# Patient Record
Sex: FEMALE | Race: WHITE | NOT HISPANIC OR LATINO | Employment: OTHER | URBAN - NONMETROPOLITAN AREA
[De-identification: names, ages, dates, MRNs, and addresses within clinical notes are randomized per-mention and may not be internally consistent; named-entity substitution may affect disease eponyms.]

---

## 2017-01-13 ENCOUNTER — OFFICE VISIT (OUTPATIENT)
Dept: MEDICAL GROUP | Facility: PHYSICIAN GROUP | Age: 72
End: 2017-01-13
Payer: MEDICARE

## 2017-01-13 VITALS
WEIGHT: 257 LBS | TEMPERATURE: 98.1 F | SYSTOLIC BLOOD PRESSURE: 138 MMHG | HEART RATE: 92 BPM | HEIGHT: 66 IN | RESPIRATION RATE: 16 BRPM | BODY MASS INDEX: 41.3 KG/M2 | DIASTOLIC BLOOD PRESSURE: 86 MMHG | OXYGEN SATURATION: 97 %

## 2017-01-13 DIAGNOSIS — E66.01 MORBID OBESITY WITH BMI OF 40.0-44.9, ADULT (HCC): ICD-10-CM

## 2017-01-13 DIAGNOSIS — E78.5 DYSLIPIDEMIA: ICD-10-CM

## 2017-01-13 DIAGNOSIS — L98.9 SKIN LESION: ICD-10-CM

## 2017-01-13 DIAGNOSIS — L40.9 PSORIASIS: ICD-10-CM

## 2017-01-13 DIAGNOSIS — E03.9 HYPOTHYROIDISM, UNSPECIFIED TYPE: ICD-10-CM

## 2017-01-13 DIAGNOSIS — Z12.31 VISIT FOR SCREENING MAMMOGRAM: ICD-10-CM

## 2017-01-13 DIAGNOSIS — Z00.00 HEALTHCARE MAINTENANCE: ICD-10-CM

## 2017-01-13 PROBLEM — Z12.39 SCREENING FOR BREAST CANCER: Status: RESOLVED | Noted: 2017-01-13 | Resolved: 2017-01-13

## 2017-01-13 PROBLEM — Z12.39 SCREENING FOR BREAST CANCER: Status: ACTIVE | Noted: 2017-01-13

## 2017-01-13 PROBLEM — R20.9 SENSATION DISTURBANCE OF SKIN: Status: ACTIVE | Noted: 2017-01-13

## 2017-01-13 PROCEDURE — 99214 OFFICE O/P EST MOD 30 MIN: CPT | Performed by: NURSE PRACTITIONER

## 2017-01-13 RX ORDER — TRIAMCINOLONE ACETONIDE 1 MG/G
1 CREAM TOPICAL 2 TIMES DAILY
Qty: 1 TUBE | Refills: 2 | Status: SHIPPED | OUTPATIENT
Start: 2017-01-13 | End: 2018-07-26 | Stop reason: SDUPTHER

## 2017-01-13 RX ORDER — LEVOTHYROXINE SODIUM 175 UG/1
TABLET ORAL
Qty: 90 TAB | Refills: 3 | Status: SHIPPED | OUTPATIENT
Start: 2017-01-13 | End: 2018-01-24 | Stop reason: SDUPTHER

## 2017-01-13 ASSESSMENT — PATIENT HEALTH QUESTIONNAIRE - PHQ9: CLINICAL INTERPRETATION OF PHQ2 SCORE: 0

## 2017-01-13 NOTE — ASSESSMENT & PLAN NOTE
Patient is complaining of itchy lesion on her left upper back that she is noted for the last 2 years. She notices it to be more itchy when she gets out of the shower. She also notices it to be more scaly as well. Upon exam it does appear to be a seborrheic keratoses. However, while the borders are uniform and symmetric, there is a variegation color in the upper portion of this 1 x 1.5 cm skin lesion. We will watch it and reassess it at her follow-up appointment. If this is changed we will refer to dermatology for biopsy.

## 2017-01-13 NOTE — ASSESSMENT & PLAN NOTE
This is chronic in nature, she states controlled well with levothyroxine 175 µg daily. 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 She tolerates the medication well with no significant bothersome side effects. She does need refills, these were called in for her. She is also due for labs, these were ordered. She is to have labs done before she sees me in follow-up in 2 months.

## 2017-01-13 NOTE — PROGRESS NOTES
Chief Complaint   Patient presents with   • Establish Care     Medication renewals         This is a 71 y.o.female patient that presents today with the following: Establish care with new PCP, labs, health maintenance    Hypothyroidism  This is chronic in nature, she states controlled well with levothyroxine 175 µg daily. 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 She tolerates the medication well with no significant bothersome side effects. She does need refills, these were called in for her. She is also due for labs, these were ordered. She is to have labs done before she sees me in follow-up in 2 months.    Dyslipidemia  Chronic in nature, status of control unknown. She is not on statin therapy. She is due for labs, these ordered. Encouraged her to follow healthy diet, increase physical activity and continue efforts towards weight loss. She is to follow-up with me in 2 months.    Psoriasis  She reports this is a chronic problem, controlled well with topical Kenalog cream. She is requesting refills, these were called in for her.    Skin lesion  Patient is complaining of itchy lesion on her left upper back that she is noted for the last 2 years. She notices it to be more itchy when she gets out of the shower. She also notices it to be more scaly as well. Upon exam it does appear to be a seborrheic keratoses. However, while the borders are uniform and symmetric, there is a variegation color in the upper portion of this 1 x 1.5 cm skin lesion. We will watch it and reassess it at her follow-up appointment. If this is changed we will refer to dermatology for biopsy.     Morbid obesity with BMI of 40.0-44.9, adult (HCC)  Chronic in nature, uncontrolled. BMI 41.48 her weight is 257. We will be rechecking her TSH today. However, upon review of her last few visits, her weight has been stable. I've encouraged her to increase her physical activity, make lifestyle  modifications including healthier food choices, eating less carbs. We will reassess this at her follow-up appointment.    Healthcare maintenance  Patient is past due for some of her health care maintenance exams. We have ordered her mammogram to screen for breast cancer. She is up to date for her colonoscopy. She declines flu shot as well as pneumonia shot, this is despite discussed risks versus benefits. She is agreeable to the shingles shot, however she will have to get this done at a local pharmacy, as our clinic is out of stock. When discussing Pap smears, I did tell her that her current guidelines she has aged out of routine screening, especially since she tells me she has always had normal Pap smears. I did tell her that if she would like to continue doing screening exams I would be willing to do this for her. She has elected to not do screening Pap smears anymore. She is due for routine fasting labs, as well as thyroid test, these were ordered.      No visits with results within 1 Month(s) from this visit.  Latest known visit with results is:    Hospital Outpatient Visit on 12/02/2015   Component Date Value   • Sodium 12/02/2015 138    • Potassium 12/02/2015 4.1    • Chloride 12/02/2015 107    • Co2 12/02/2015 26    • Anion Gap 12/02/2015 5.0    • Glucose 12/02/2015 93    • Bun 12/02/2015 13    • Creatinine 12/02/2015 0.90    • Calcium 12/02/2015 9.1    • AST(SGOT) 12/02/2015 16    • ALT(SGPT) 12/02/2015 15    • Alkaline Phosphatase 12/02/2015 80    • Total Bilirubin 12/02/2015 0.8    • Albumin 12/02/2015 4.0    • Total Protein 12/02/2015 7.1    • Globulin 12/02/2015 3.1    • A-G Ratio 12/02/2015 1.3    • Cholesterol,Tot 12/02/2015 208*   • Triglycerides 12/02/2015 104    • HDL 12/02/2015 48    • LDL 12/02/2015 139*   • 25-Hydroxy   Vitamin D 25 12/02/2015 31    • TSH 12/02/2015 1.750    • Free T-4 12/02/2015 1.05    • GFR If  12/02/2015 >60    • GFR If Non  Ameri* 12/02/2015 >60   "        clinical course has been stable    Past Medical History   Diagnosis Date   • Hypothyroidism 12/1/2011   • History of thyroid cancer 7/10/2013   • Right hip pain 4/23/2014     X 2 weeks ago Radiates down right leg 10/10 pain 2 weeks ago Was hitting tennis balls against the wall then hours later it started hurting severely Was gradually getting better, then got cold, then got worse again to an 8/10 instead of 10/10 Stopped taking aleve/asa, tearing up stomach; did not take any apap Starts in si joint area, down lateral leg to the knee, feels \"wierd/numb\" hard to describe       Past Surgical History   Procedure Laterality Date   • Thyroidectomy total     • Lumpectomy       benign   • Tonsillectomy         Family History   Problem Relation Age of Onset   • Other Mother      \"old age\"   • Hypertension Father    • Stroke Father        Pcn    Current Outpatient Prescriptions Ordered in [facility]   Medication Sig Dispense Refill   • levothyroxine (SYNTHROID) 175 MCG Tab Take daily, on empty 90 Tab 3   • triamcinolone acetonide (KENALOG) 0.1 % Cream Apply 1 Application to affected area(s) 2 times a day. Max use 14 days. 1 Tube 2     No current Epic-ordered facility-administered medications on file.       Constitutional ROS: No unexpected change in weight, No weakness, No unexplained fevers, sweats, or chills  Neck ROS: No lumps or masses, No swollen glands, Positive for history of thyroid cancer, hypothyroidism-- per history of present illness  Pulmonary ROS: No chronic cough, sputum, or hemoptysis, No shortness of breath, No recent change in breathing  Cardiovascular ROS: No chest pain, No edema, No palpitations  Gastrointestinal ROS: No abdominal pain, No nausea, vomiting, diarrhea, or constipation, no blood in stool  Musculoskeletal/Extremities ROS: No clubbing, No peripheral edema, No pain, redness or swelling on the joints  Skin/Integumentary ROS: Positive per history of present illness  Neurologic ROS: Normal " "development, No seizures, No weakness  All other systems reviewed and are within normal limits    Physical exam:  /86 mmHg  Pulse 92  Temp(Src) 36.7 °C (98.1 °F)  Resp 16  Ht 1.676 m (5' 6\")  Wt 116.574 kg (257 lb)  BMI 41.50 kg/m2  SpO2 97%  Breastfeeding? No  General Appearance: Older female, alert, no distress, morbidly obese, well-groomed  Skin: 1 x 1.5 cm skin lesion to left upper back, consistent with seborrheic keratoses, uniform and symmetric borders with variegation in color  Eyes: conjunctivae/corneas clear. PERRL, EOM's intact.   Ears: External ears normal. Canals clear. TM's normal.  Oropharynx: Lips, mucosa, and tongue normal. Teeth and gums normal. Oropharynx moist and without lesion  Neck: negative findings: no adenopathy, trachea midline and normal to palpitation, horizontal scar at base of neck  Lungs: negative findings: normal respiratory rate and rhythm, lungs clear to auscultation  Heart: negative. RRR without murmur, gallop, or rubs.  No ectopy.  Abdomen: Abdomen soft, non-tender. BS normal. No masses,  No organomegaly  Musculoskeletal: negative  Neurologic: intact, oriented, mood appropriate, judgment intact. Cranial nerves II-12 grossly intact    Medical decision making/discussion: Patient here to establish care with new PCP and discuss her acute and chronic conditions. She is due for routine fasting labs, these were ordered. Her mammogram was ordered as well. I have refilled her medications. I've instructed her to have her she will shot at local pharmacy, as we are out of stock here at our clinic. She is declining flu shot as well as pneumonia shot this is despite discussed risks versus benefits. She is to follow-up with me in 2 months.    Tita was seen today for establish care.    Diagnoses and all orders for this visit:    Hypothyroidism, unspecified type  -     TSH WITH REFLEX TO FT4; Future  -     levothyroxine (SYNTHROID) 175 MCG Tab; Take daily, on " empty    Psoriasis  Comments:  Uses triamcinolone for flares.  Orders:  -     triamcinolone acetonide (KENALOG) 0.1 % Cream; Apply 1 Application to affected area(s) 2 times a day. Max use 14 days.    Dyslipidemia  -     COMP METABOLIC PANEL; Future  -     LIPID PROFILE; Future    Skin lesion    Visit for screening mammogram  -     MA-SCREEN MAMMO W/CAD-BILAT; Future    Morbid obesity with BMI of 40.0-44.9, adult (HCC)  -     Patient identified as having weight management issue.  Appropriate orders and counseling given.    Healthcare maintenance                Please note that this dictation was created using voice recognition software. I have made every reasonable attempt to correct obvious errors, but I expect that there are errors of grammar and possibly content that I did not discover before finalizing the note.

## 2017-01-13 NOTE — PATIENT INSTRUCTIONS
Labs, these are fasting. Have done in the next few weeks or even month. Follow up with me in 2 months    Mammogram    Meds have been refilled, take as prescribed    Will reassess skin lesion on back at follow up appointment, may need referral to derm

## 2017-01-13 NOTE — ASSESSMENT & PLAN NOTE
Chronic in nature, status of control unknown. She is not on statin therapy. She is due for labs, these ordered. Encouraged her to follow healthy diet, increase physical activity and continue efforts towards weight loss. She is to follow-up with me in 2 months.

## 2017-01-13 NOTE — ASSESSMENT & PLAN NOTE
She reports this is a chronic problem, controlled well with topical Kenalog cream. She is requesting refills, these were called in for her.

## 2017-01-13 NOTE — MR AVS SNAPSHOT
"Tita Argueta   2017 1:00 PM   Office Visit   MRN: [id number]    Department:  [facility]   Dept Phone:  [phone]    Description:  Female : 1945   Provider:  RHONDA Melgar           Reason for Visit     Establish Care Medication renewals      Allergies as of 2017     Allergen Noted Reactions    Pcn [Penicillins] 2011       RASHES PER PT      You were diagnosed with     Hypothyroidism, unspecified type   [1182799]       Psoriasis   [156378]   Uses triamcinolone for flares.    Dyslipidemia   [967120]       Skin lesion   [370149]       Visit for screening mammogram   [[id number]]         Vital Signs     Blood Pressure Pulse Temperature Respirations Height Weight    138/86 mmHg 92 36.7 °C (98.1 °F) 16 1.676 m (5' 6\") 116.574 kg (257 lb)    Body Mass Index Oxygen Saturation Breastfeeding? Smoking Status          41.50 kg/m2 97% No Never Smoker         Basic Information     Date Of Birth Sex Race Ethnicity Preferred Language    1945 Female White Non- English      Problem List              ICD-10-CM Priority Class Noted - Resolved    Hypothyroidism E03.9   2011 - Present    Dyslipidemia E78.5   2012 - Present    History of thyroid cancer Z85.850   7/10/2013 - Present    Psoriasis L40.9   7/10/2013 - Present    Right hip pain M25.551   2014 - Present    Chronic cough R05   2015 - Present    Environmental and seasonal allergies J30.89   2015 - Present    Sensation disturbance of skin R20.9   2017 - Present    History of papillary adenocarcinoma of thyroid Z85.850   5/10/2010 - Present    Hypothyroidism following radioiodine therapy E89.0   2005 - Present    Skin lesion L98.9   2017 - Present      Health Maintenance        Date Due Completion Dates    IMM DTaP/Tdap/Td Vaccine (1 - Tdap) 1966 ---    IMM ZOSTER VACCINE 2007 ---    IMM PNEUMOCOCCAL 65+ (ADULT) LOW/MEDIUM RISK SERIES (1 of 2 - PCV13) 2012 ---   "    MAMMOGRAM 12/9/2016 12/9/2015, 12/12/2012    IMM INFLUENZA (1) 10/2/2017 (Originally 9/1/2016) 11/23/2015    BONE DENSITY 12/12/2017 12/12/2012    COLONOSCOPY 7/10/2023 7/10/2013 (Declined)    Override on 7/10/2013: Patient Declined (declines at this time)            Current Immunizations     Influenza Vaccine Adult HD 11/23/2015 10:42 AM      Below and/or attached are the medications your provider expects you to take. Review all of your home medications and newly ordered medications with your provider and/or pharmacist. Follow medication instructions as directed by your provider and/or pharmacist. Please keep your medication list with you and share with your provider. Update the information when medications are discontinued, doses are changed, or new medications (including over-the-counter products) are added; and carry medication information at all times in the event of emergency situations     Allergies:  PCN - (reactions not documented)               Medications  Valid as of: January 13, 2017 -  1:44 PM    Generic Name Brand Name Tablet Size Instructions for use    Levothyroxine Sodium (Tab) SYNTHROID 175 MCG Take daily, on empty        Triamcinolone Acetonide (Cream) KENALOG 0.1 % Apply 1 Application to affected area(s) 2 times a day. Max use 14 days.        .                 Medicines prescribed today were sent to:     [facility] [address] [place] [id number]    Phone: [phone] Fax: [phone]    Open 24 Hours?: No      Medication refill instructions:       If your prescription bottle indicates you have medication refills left, it is not necessary to call your provider’s office. Please contact your pharmacy and they will refill your medication.    If your prescription bottle indicates you do not have any refills left, you may request refills at any time through one of the following ways: The online [employer] system (except Urgent Care),  by calling your provider’s office, or by asking your pharmacy to contact your provider’s office with a refill request. Medication refills are processed only during regular business hours and may not be available until the next business day. Your provider may request additional information or to have a follow-up visit with you prior to refilling your medication.   *Please Note: Medication refills are assigned a new Rx number when refilled electronically. Your pharmacy may indicate that no refills were authorized even though a new prescription for the same medication is available at the pharmacy. Please request the medicine by name with the pharmacy before contacting your provider for a refill.        Your To Do List     Future Labs/Procedures Complete By Expires    COMP METABOLIC PANEL  As directed 1/13/2018    LIPID PROFILE  As directed 1/13/2018    MA-SCREEN MAMMO W/CAD-BILAT  As directed 1/13/2018    TSH WITH REFLEX TO FT4  As directed 1/13/2018      Instructions    Labs, these are fasting. Have done in the next few weeks or even month. Follow up with me in 2 months    Mammogram    Meds have been refilled, take as prescribed    Will reassess skin lesion on back at follow up appointment, may need referral to derm              MyChart Access Code: Activation code not generated  Current MyChart Status: Active

## 2017-01-13 NOTE — ASSESSMENT & PLAN NOTE
Patient is past due for some of her health care maintenance exams. We have ordered her mammogram to screen for breast cancer. She is up to date for her colonoscopy. She declines flu shot as well as pneumonia shot, this is despite discussed risks versus benefits. She is agreeable to the shingles shot, however she will have to get this done at a local pharmacy, as our clinic is out of stock. When discussing Pap smears, I did tell her that her current guidelines she has aged out of routine screening, especially since she tells me she has always had normal Pap smears. I did tell her that if she would like to continue doing screening exams I would be willing to do this for her. She has elected to not do screening Pap smears anymore. She is due for routine fasting labs, as well as thyroid test, these were ordered.

## 2017-01-13 NOTE — ASSESSMENT & PLAN NOTE
Chronic in nature, uncontrolled. BMI 41.48 her weight is 257. We will be rechecking her TSH today. However, upon review of her last few visits, her weight has been stable. I've encouraged her to increase her physical activity, make lifestyle modifications including healthier food choices, eating less carbs. We will reassess this at her follow-up appointment.

## 2017-02-21 ENCOUNTER — HOSPITAL ENCOUNTER (OUTPATIENT)
Dept: LAB | Facility: MEDICAL CENTER | Age: 72
End: 2017-02-21
Attending: NURSE PRACTITIONER
Payer: MEDICARE

## 2017-02-21 DIAGNOSIS — E78.5 DYSLIPIDEMIA: ICD-10-CM

## 2017-02-21 DIAGNOSIS — E03.9 HYPOTHYROIDISM, UNSPECIFIED TYPE: ICD-10-CM

## 2017-02-21 LAB
ALBUMIN SERPL BCP-MCNC: 3.6 G/DL (ref 3.2–4.9)
ALBUMIN/GLOB SERPL: 1 G/DL
ALP SERPL-CCNC: 82 U/L (ref 30–99)
ALT SERPL-CCNC: 13 U/L (ref 2–50)
ANION GAP SERPL CALC-SCNC: 5 MMOL/L (ref 0–11.9)
AST SERPL-CCNC: 14 U/L (ref 12–45)
BILIRUB SERPL-MCNC: 0.6 MG/DL (ref 0.1–1.5)
BUN SERPL-MCNC: 15 MG/DL (ref 8–22)
CALCIUM SERPL-MCNC: 8.9 MG/DL (ref 8.5–10.5)
CHLORIDE SERPL-SCNC: 108 MMOL/L (ref 96–112)
CHOLEST SERPL-MCNC: 203 MG/DL (ref 100–199)
CO2 SERPL-SCNC: 26 MMOL/L (ref 20–33)
CREAT SERPL-MCNC: 0.84 MG/DL (ref 0.5–1.4)
GLOBULIN SER CALC-MCNC: 3.5 G/DL (ref 1.9–3.5)
GLUCOSE SERPL-MCNC: 90 MG/DL (ref 65–99)
HDLC SERPL-MCNC: 55 MG/DL
LDLC SERPL CALC-MCNC: 132 MG/DL
POTASSIUM SERPL-SCNC: 4.2 MMOL/L (ref 3.6–5.5)
PROT SERPL-MCNC: 7.1 G/DL (ref 6–8.2)
SODIUM SERPL-SCNC: 139 MMOL/L (ref 135–145)
TRIGL SERPL-MCNC: 81 MG/DL (ref 0–149)
TSH SERPL DL<=0.005 MIU/L-ACNC: 1.84 UIU/ML (ref 0.3–3.7)

## 2017-02-21 PROCEDURE — 80061 LIPID PANEL: CPT

## 2017-02-21 PROCEDURE — 84443 ASSAY THYROID STIM HORMONE: CPT

## 2017-02-21 PROCEDURE — 80053 COMPREHEN METABOLIC PANEL: CPT

## 2017-02-21 PROCEDURE — 36415 COLL VENOUS BLD VENIPUNCTURE: CPT

## 2017-07-07 ENCOUNTER — PATIENT OUTREACH (OUTPATIENT)
Dept: HEALTH INFORMATION MANAGEMENT | Facility: OTHER | Age: 72
End: 2017-07-07

## 2017-07-07 NOTE — PROGRESS NOTES
Attempt #:1    WebIZ Checked & Epic Updated: yes  HealthConnect Verified: yes  Verify PCP: yes    Communication Preference Obtained: no     Review Care Team: no    Annual Wellness Visit Scheduling  1. Scheduling Status:Not Scheduled. Patient states they think this is a scam     Care Gap Scheduling (Attempt to Schedule EACH Overdue Care Gap!)     Health Maintenance Due   Topic Date Due   • Annual Wellness Visit  DECLINED    • IMM DTaP/Tdap/Td Vaccine (1 - Tdap) NOT ABLE TO ADDRESS    • IMM ZOSTER VACCINE  NOT ABLE TO ADDRESS    • IMM PNEUMOCOCCAL 65+ (ADULT) LOW/MEDIUM RISK SERIES (1 of 2 - PCV13) NOT ABLE TO ADDRESS    • MAMMOGRAM  NOT ABLE TO ADDRESS          MyChart Activation: already active  [facility]: no  Virtual Visits: no  Opt In to Text Messages: no

## 2018-01-24 ENCOUNTER — OFFICE VISIT (OUTPATIENT)
Dept: MEDICAL GROUP | Facility: PHYSICIAN GROUP | Age: 73
End: 2018-01-24
Payer: MEDICARE

## 2018-01-24 VITALS
SYSTOLIC BLOOD PRESSURE: 138 MMHG | HEIGHT: 66 IN | WEIGHT: 254 LBS | BODY MASS INDEX: 40.82 KG/M2 | TEMPERATURE: 97.7 F | HEART RATE: 86 BPM | RESPIRATION RATE: 16 BRPM | OXYGEN SATURATION: 98 % | DIASTOLIC BLOOD PRESSURE: 80 MMHG

## 2018-01-24 DIAGNOSIS — Z12.31 VISIT FOR SCREENING MAMMOGRAM: ICD-10-CM

## 2018-01-24 DIAGNOSIS — Z78.0 POSTMENOPAUSAL ESTROGEN DEFICIENCY: ICD-10-CM

## 2018-01-24 DIAGNOSIS — Z11.59 NEED FOR HEPATITIS C SCREENING TEST: ICD-10-CM

## 2018-01-24 DIAGNOSIS — E78.5 DYSLIPIDEMIA: ICD-10-CM

## 2018-01-24 DIAGNOSIS — Z13.6 SCREENING FOR CARDIOVASCULAR CONDITION: ICD-10-CM

## 2018-01-24 DIAGNOSIS — L40.9 PSORIASIS: ICD-10-CM

## 2018-01-24 DIAGNOSIS — Z00.00 HEALTHCARE MAINTENANCE: ICD-10-CM

## 2018-01-24 DIAGNOSIS — E03.9 HYPOTHYROIDISM, UNSPECIFIED TYPE: ICD-10-CM

## 2018-01-24 DIAGNOSIS — M79.89 LEFT LEG SWELLING: ICD-10-CM

## 2018-01-24 PROCEDURE — 99214 OFFICE O/P EST MOD 30 MIN: CPT | Performed by: NURSE PRACTITIONER

## 2018-01-24 RX ORDER — DESONIDE 0.5 MG/G
CREAM TOPICAL 2 TIMES DAILY
COMMUNITY
End: 2018-01-24 | Stop reason: SDUPTHER

## 2018-01-24 RX ORDER — DESONIDE 0.5 MG/G
CREAM TOPICAL
Qty: 30 G | Refills: 1 | Status: SHIPPED | OUTPATIENT
Start: 2018-01-24

## 2018-01-24 RX ORDER — LEVOTHYROXINE SODIUM 175 UG/1
TABLET ORAL
Qty: 90 TAB | Refills: 3 | Status: SHIPPED | OUTPATIENT
Start: 2018-01-24 | End: 2019-01-31 | Stop reason: SDUPTHER

## 2018-01-24 ASSESSMENT — PATIENT HEALTH QUESTIONNAIRE - PHQ9: CLINICAL INTERPRETATION OF PHQ2 SCORE: 0

## 2018-01-24 NOTE — PATIENT INSTRUCTIONS
Follow up with me annually and as needed    Labs any time in the next week    meds refilled    Mammogram and Dexa scan ordered    Ultrasound of L leg

## 2018-01-24 NOTE — ASSESSMENT & PLAN NOTE
This is a chronic condition, stable and controlled with lifestyle modifications. She does not take statin therapy. Her lipid profile is as follows:  Component      Latest Ref Rng & Units 2/21/2017          11:16 AM   Cholesterol,Tot      100 - 199 mg/dL 203 (H)   Triglycerides      0 - 149 mg/dL 81   HDL      >=40 mg/dL 55   LDL      <100 mg/dL 132 (H)   She is at 10.97% risk of CV event in the next 10 years. She continues to decline statin therapy. Patient is due for repeat labs, these have been ordered. We discussed the importance of healthy low-fat, low-cholesterol diet, regular physical activity and continued efforts towards weight loss.

## 2018-01-24 NOTE — ASSESSMENT & PLAN NOTE
This is a chronic condition, stable and well controlled with levothyroxine 175 µg daily. She does carry a past history of papillary adenocarcinoma of the thyroid, she status post total thyroidectomy. She tolerates her medication well with no significant bothersome side effects. She does need refills, these were called in for her. She understands to take this on an empty stomach first thing in the morning apart from other medications. She is due for labs, these have been ordered.

## 2018-01-24 NOTE — ASSESSMENT & PLAN NOTE
Patient has chronic left lower extremity discomfort with mild swelling. She states that over this past summer she did experience an episode where her left lower extremity particularly below the knee became more swollen than usual, red and warm. She did not experience any chest pain or shortness of breath. She at the time was concerned for blood clot, but she did not come in for evaluation. Today upon physical examination is noted that her left lower extremity is slightly more edematous than her right, but there is no redness, warmth or pain with palpation. I did discuss with her that I would however like to get an ultrasound of the left lower extremity, patient agrees with plan. I did give her ER precautions.

## 2018-01-24 NOTE — ASSESSMENT & PLAN NOTE
This is a chronic condition, stable. Is well-controlled with topical Kenalog cream and as needed desonide. She does need refills on that assessment, this was called in for her.

## 2018-01-24 NOTE — ASSESSMENT & PLAN NOTE
Patient is due for routine fasting labs, these were ordered. She is also past due for mammogram as well as DEXA scan, these were ordered. She'll ever has Pap smears.

## 2018-01-24 NOTE — PROGRESS NOTES
Chief Complaint   Patient presents with   • Hypothyroidism     levothyroxine         This is a 72 y.o.female patient that presents today with the following: Follow-up visit, review labs, discuss acute and chronic conditions    Dyslipidemia  This is a chronic condition, stable and controlled with lifestyle modifications. She does not take statin therapy. Her lipid profile is as follows:  Component      Latest Ref Rng & Units 2/21/2017          11:16 AM   Cholesterol,Tot      100 - 199 mg/dL 203 (H)   Triglycerides      0 - 149 mg/dL 81   HDL      >=40 mg/dL 55   LDL      <100 mg/dL 132 (H)   She is at 10.97% risk of CV event in the next 10 years. She continues to decline statin therapy. Patient is due for repeat labs, these have been ordered. We discussed the importance of healthy low-fat, low-cholesterol diet, regular physical activity and continued efforts towards weight loss.    Hypothyroidism  This is a chronic condition, stable and well controlled with levothyroxine 175 µg daily. She does carry a past history of papillary adenocarcinoma of the thyroid, she status post total thyroidectomy. She tolerates her medication well with no significant bothersome side effects. She does need refills, these were called in for her. She understands to take this on an empty stomach first thing in the morning apart from other medications. She is due for labs, these have been ordered.    Psoriasis  This is a chronic condition, stable. Is well-controlled with topical Kenalog cream and as needed desonide. She does need refills on that assessment, this was called in for her.    Left leg swelling  Patient has chronic left lower extremity discomfort with mild swelling. She states that over this past summer she did experience an episode where her left lower extremity particularly below the knee became more swollen than usual, red and warm. She did not experience any chest pain or shortness of breath. She at the time was concerned for  "blood clot, but she did not come in for evaluation. Today upon physical examination is noted that her left lower extremity is slightly more edematous than her right, but there is no redness, warmth or pain with palpation. I did discuss with her that I would however like to get an ultrasound of the left lower extremity, patient agrees with plan. I did give her ER precautions.      No visits with results within 1 Month(s) from this visit.   Latest known visit with results is:   Hospital Outpatient Visit on 02/21/2017   Component Date Value   • Sodium 02/21/2017 139    • Potassium 02/21/2017 4.2    • Chloride 02/21/2017 108    • Co2 02/21/2017 26    • Anion Gap 02/21/2017 5.0    • Glucose 02/21/2017 90    • Bun 02/21/2017 15    • Creatinine 02/21/2017 0.84    • Calcium 02/21/2017 8.9    • AST(SGOT) 02/21/2017 14    • ALT(SGPT) 02/21/2017 13    • Alkaline Phosphatase 02/21/2017 82    • Total Bilirubin 02/21/2017 0.6    • Albumin 02/21/2017 3.6    • Total Protein 02/21/2017 7.1    • Globulin 02/21/2017 3.5    • A-G Ratio 02/21/2017 1.0    • Cholesterol,Tot 02/21/2017 203*   • Triglycerides 02/21/2017 81    • HDL 02/21/2017 55    • LDL 02/21/2017 132*   • TSH 02/21/2017 1.840    • GFR If  02/21/2017 >60    • GFR If Non  [employer]meri* 02/21/2017 >60          clinical course has been stable    Past Medical History:   Diagnosis Date   • History of thyroid cancer 7/10/2013   • Hypothyroidism 12/1/2011   • Right hip pain 4/23/2014    X 2 weeks ago Radiates down right leg 10/10 pain 2 weeks ago Was hitting tennis balls against the wall then hours later it started hurting severely Was gradually getting better, then got cold, then got worse again to an 8/10 instead of 10/10 Stopped taking aleve/asa, tearing up stomach; did not take any apap Starts in si joint area, down lateral leg to the knee, feels \"wierd/numb\" hard to describe       Past Surgical History:   Procedure Laterality Date   • LUMPECTOMY      benign " "  • THYROIDECTOMY TOTAL     • TONSILLECTOMY         Family History   Problem Relation Age of Onset   • Other Mother      \"old age\"   • Hypertension Father    • Stroke Father        Pcn [penicillins]    Current Outpatient Prescriptions Ordered in [facility]   Medication Sig Dispense Refill   • desonide (TRIDESILON) 0.05 % Cream Apply to affected area twice daily no more than 4 weeks at a time 30 g 1   • levothyroxine (SYNTHROID) 175 MCG Tab Take daily, on empty 90 Tab 3   • triamcinolone acetonide (KENALOG) 0.1 % Cream Apply 1 Application to affected area(s) 2 times a day. Max use 14 days. 1 Tube 2     No current Epic-ordered facility-administered medications on file.        Constitutional ROS: No unexpected change in weight, No weakness, No unexplained fevers, sweats, or chills  Pulmonary ROS: No chronic cough, sputum, or hemoptysis, No shortness of breath, No recent change in breathing  Cardiovascular ROS: No chest pain, No edema, No palpitations  Gastrointestinal ROS: No abdominal pain, No nausea, vomiting, diarrhea, or constipation, no blood in stool  Musculoskeletal/Extremities ROS: Positive per history of present illness  Neurologic ROS: Normal development, No seizures, No weakness  Endocrine ROS: Positive per history of present illness    Physical exam:  /80   Pulse 86   Temp 36.5 °C (97.7 °F)   Resp 16   Ht 1.676 m (5' 6\")   Wt 115.2 kg (254 lb)   SpO2 98%   BMI 41.00 kg/m²   General Appearance: Elderly female, alert, no distress, morbidly obese, well-groomed  Skin: Skin color, texture, turgor normal. No rashes or lesions.  Lungs: negative findings: normal respiratory rate and rhythm, lungs clear to auscultation  Heart: negative. RRR without murmur, gallop, or rubs.  No ectopy.  Abdomen: Abdomen soft, non-tender. BS normal. No masses,  No organomegaly  Extremities: positive findings: Very mild swelling to left lower extremity, nonpitting edema. Positive for varicose veins bilateral lower extremities, " left greater than right. Negative for redness, warmth or pain with palpation of the left calf  Musculoskeletal: negative findings: no evidence of joint instability, strength normal, no deformities present  Neurologic: intact, oriented, mood appropriate, judgment intact. Cranial nerves II-12 grossly intact    Medical decision making/discussion: Patient to follow up with me annually and as needed. She is to have labs done anytime in the next week or so, we will notify her of results of further actions if needed. Her medications have been refilled, she is to take these as prescribed and call for refills as needed. Mammogram and DEXA scan have been ordered. I will also get ultrasound of left lower extremity and call her with results of further actions if needed. She has been given ER precautions should she develop sudden severe left calf pain, redness, swelling, shortness of breath or chest pain.    Tita was seen today for hypothyroidism.    Diagnoses and all orders for this visit:    Psoriasis  -     desonide (TRIDESILON) 0.05 % Cream; Apply to affected area twice daily no more than 4 weeks at a time    Hypothyroidism, unspecified type  -     TSH WITH REFLEX TO FT4; Future  -     levothyroxine (SYNTHROID) 175 MCG Tab; Take daily, on empty    Left leg swelling  -     US-EXTREMITY VENOUS UNILATERAL-LOWER LEFT; Future    Dyslipidemia  -     COMP METABOLIC PANEL; Future  -     LIPID PROFILE; Future    Postmenopausal estrogen deficiency  -     DS-BONE DENSITY STUDY (DEXA); Future    Need for hepatitis C screening test  -     HEP C VIRUS ANTIBODY    Screening for cardiovascular condition  -     COMP METABOLIC PANEL; Future  -     LIPID PROFILE; Future    Visit for screening mammogram  -     MA-SCREEN MAMMO W/CAD-BILAT; Future          Please note that this dictation was created using voice recognition software. I have made every reasonable attempt to correct obvious errors, but I expect that there are errors of grammar and  possibly content that I did not discover before finalizing the note.

## 2018-01-30 ENCOUNTER — HOSPITAL ENCOUNTER (OUTPATIENT)
Dept: LAB | Facility: MEDICAL CENTER | Age: 73
End: 2018-01-30
Attending: NURSE PRACTITIONER
Payer: MEDICARE

## 2018-01-30 DIAGNOSIS — E03.9 HYPOTHYROIDISM, UNSPECIFIED TYPE: ICD-10-CM

## 2018-01-30 DIAGNOSIS — E78.5 DYSLIPIDEMIA: ICD-10-CM

## 2018-01-30 DIAGNOSIS — Z13.6 SCREENING FOR CARDIOVASCULAR CONDITION: ICD-10-CM

## 2018-01-30 LAB
ALBUMIN SERPL BCP-MCNC: 3.8 G/DL (ref 3.2–4.9)
ALBUMIN/GLOB SERPL: 1.2 G/DL
ALP SERPL-CCNC: 107 U/L (ref 30–99)
ALT SERPL-CCNC: 19 U/L (ref 2–50)
ANION GAP SERPL CALC-SCNC: 3 MMOL/L (ref 0–11.9)
AST SERPL-CCNC: 16 U/L (ref 12–45)
BILIRUB SERPL-MCNC: 0.7 MG/DL (ref 0.1–1.5)
BUN SERPL-MCNC: 16 MG/DL (ref 8–22)
CALCIUM SERPL-MCNC: 8.9 MG/DL (ref 8.5–10.5)
CHLORIDE SERPL-SCNC: 105 MMOL/L (ref 96–112)
CHOLEST SERPL-MCNC: 209 MG/DL (ref 100–199)
CO2 SERPL-SCNC: 29 MMOL/L (ref 20–33)
CREAT SERPL-MCNC: 0.82 MG/DL (ref 0.5–1.4)
GLOBULIN SER CALC-MCNC: 3.3 G/DL (ref 1.9–3.5)
GLUCOSE SERPL-MCNC: 84 MG/DL (ref 65–99)
HCV AB SER QL: REACTIVE
HDLC SERPL-MCNC: 59 MG/DL
LDLC SERPL CALC-MCNC: 131 MG/DL
POTASSIUM SERPL-SCNC: 4.5 MMOL/L (ref 3.6–5.5)
PROT SERPL-MCNC: 7.1 G/DL (ref 6–8.2)
SODIUM SERPL-SCNC: 137 MMOL/L (ref 135–145)
TRIGL SERPL-MCNC: 94 MG/DL (ref 0–149)
TSH SERPL DL<=0.005 MIU/L-ACNC: 1.72 UIU/ML (ref 0.38–5.33)

## 2018-01-30 PROCEDURE — 80053 COMPREHEN METABOLIC PANEL: CPT

## 2018-01-30 PROCEDURE — 87522 HEPATITIS C REVRS TRNSCRPJ: CPT

## 2018-01-30 PROCEDURE — 84443 ASSAY THYROID STIM HORMONE: CPT

## 2018-01-30 PROCEDURE — 80061 LIPID PANEL: CPT

## 2018-01-30 PROCEDURE — 86803 HEPATITIS C AB TEST: CPT

## 2018-01-30 PROCEDURE — 36415 COLL VENOUS BLD VENIPUNCTURE: CPT

## 2018-02-01 ENCOUNTER — TELEPHONE (OUTPATIENT)
Dept: URGENT CARE | Facility: PHYSICIAN GROUP | Age: 73
End: 2018-02-01

## 2018-02-01 DIAGNOSIS — B19.20 HEPATITIS C VIRUS INFECTION WITHOUT HEPATIC COMA, UNSPECIFIED CHRONICITY: ICD-10-CM

## 2018-02-01 NOTE — TELEPHONE ENCOUNTER
Attempted to call pt and discuss labs results--positive for HCV-and further actions needed (additional labs and referral to GI). There was no answer, no additional phone number available and no voicemail picked up. Will try again. If no answer again, will send message via [employer]

## 2018-02-02 ENCOUNTER — HOSPITAL ENCOUNTER (OUTPATIENT)
Dept: LAB | Facility: MEDICAL CENTER | Age: 73
End: 2018-02-02
Attending: NURSE PRACTITIONER
Payer: MEDICARE

## 2018-02-02 LAB
HCV RNA SERPL NAA+PROBE-ACNC: <15 IU/ML
HCV RNA SERPL NAA+PROBE-LOG IU: <1.2 LOG IU
HCV RNA SERPL QL NAA+PROBE: NOT DETECTED
PATHOLOGY STUDY: NORMAL

## 2018-02-02 PROCEDURE — 87522 HEPATITIS C REVRS TRNSCRPJ: CPT

## 2018-02-02 PROCEDURE — 36415 COLL VENOUS BLD VENIPUNCTURE: CPT

## 2018-02-02 NOTE — TELEPHONE ENCOUNTER
Pt was notified of positive HCV antibody and additional labs ordered. She was also notified of referral to GI. All questions were answered and she will have labs done tomorrow.

## 2018-02-06 ENCOUNTER — TELEPHONE (OUTPATIENT)
Dept: MEDICAL GROUP | Facility: PHYSICIAN GROUP | Age: 73
End: 2018-02-06

## 2018-02-06 NOTE — TELEPHONE ENCOUNTER
Please send letter to pt with [facility]'s contact information and let her know they are trying to call her to set up appt--she can call them and set up appt herself. But, please try calling her first before sending the letter.

## 2018-02-12 ENCOUNTER — HOSPITAL ENCOUNTER (OUTPATIENT)
Dept: RADIOLOGY | Facility: MEDICAL CENTER | Age: 73
End: 2018-02-12
Attending: NURSE PRACTITIONER
Payer: MEDICARE

## 2018-02-12 DIAGNOSIS — Z78.0 POSTMENOPAUSAL ESTROGEN DEFICIENCY: ICD-10-CM

## 2018-02-12 DIAGNOSIS — Z12.31 VISIT FOR SCREENING MAMMOGRAM: ICD-10-CM

## 2018-02-12 DIAGNOSIS — M79.89 LEFT LEG SWELLING: ICD-10-CM

## 2018-02-12 PROCEDURE — 77080 DXA BONE DENSITY AXIAL: CPT

## 2018-02-12 PROCEDURE — 93971 EXTREMITY STUDY: CPT | Mod: LT

## 2018-02-12 PROCEDURE — 77063 BREAST TOMOSYNTHESIS BI: CPT

## 2018-03-11 ENCOUNTER — PATIENT OUTREACH (OUTPATIENT)
Dept: HEALTH INFORMATION MANAGEMENT | Facility: OTHER | Age: 73
End: 2018-03-11

## 2018-03-11 NOTE — PROGRESS NOTES
Attempt #:FINAL      [facility] Verified: yes  Verify PCP: no    Communication Preference Obtained: no     Review Care Team: no    Annual Wellness Visit Scheduling  1. Scheduling Status:NOT SCHEDULED/NO ANSWER       Care Gap Scheduling (Attempt to Schedule EACH Overdue Care Gap!)  Health Maintenance Due   Topic Date Due   • Annual Wellness Visit  1945   • IMM DTaP/Tdap/Td Vaccine (1 - Tdap) 07/25/1966   • IMM ZOSTER VACCINE  07/25/2007   • IMM PNEUMOCOCCAL 65+ (ADULT) LOW/MEDIUM RISK SERIES (1 of 2 - PCV13) 07/25/2012   • IMM INFLUENZA (1) 09/01/2017           MyChart Activation: already active

## 2018-04-06 ENCOUNTER — TELEPHONE (OUTPATIENT)
Dept: MEDICAL GROUP | Facility: CLINIC | Age: 73
End: 2018-04-06

## 2018-07-26 DIAGNOSIS — L40.9 PSORIASIS: ICD-10-CM

## 2018-07-27 RX ORDER — TRIAMCINOLONE ACETONIDE 1 MG/G
CREAM TOPICAL
Qty: 1 TUBE | Refills: 1 | Status: SHIPPED | OUTPATIENT
Start: 2018-07-27

## 2018-07-27 NOTE — TELEPHONE ENCOUNTER
Was the patient seen in the last year in this department? Yes    Does patient have an active prescription for medications requested? No     Received Request Via: Pharmacy      Pt met protocol?: Yes pt last ov 1/2018

## 2019-01-31 ENCOUNTER — OFFICE VISIT (OUTPATIENT)
Dept: MEDICAL GROUP | Facility: PHYSICIAN GROUP | Age: 74
End: 2019-01-31
Payer: MEDICARE

## 2019-01-31 VITALS
HEIGHT: 66 IN | DIASTOLIC BLOOD PRESSURE: 90 MMHG | HEART RATE: 97 BPM | WEIGHT: 260 LBS | OXYGEN SATURATION: 97 % | BODY MASS INDEX: 41.78 KG/M2 | TEMPERATURE: 98.2 F | SYSTOLIC BLOOD PRESSURE: 142 MMHG | RESPIRATION RATE: 20 BRPM

## 2019-01-31 DIAGNOSIS — M25.552 BILATERAL HIP PAIN: ICD-10-CM

## 2019-01-31 DIAGNOSIS — J30.89 ENVIRONMENTAL AND SEASONAL ALLERGIES: ICD-10-CM

## 2019-01-31 DIAGNOSIS — E78.5 DYSLIPIDEMIA: ICD-10-CM

## 2019-01-31 DIAGNOSIS — E03.9 HYPOTHYROIDISM, UNSPECIFIED TYPE: ICD-10-CM

## 2019-01-31 DIAGNOSIS — E66.01 MORBID OBESITY WITH BMI OF 40.0-44.9, ADULT (HCC): ICD-10-CM

## 2019-01-31 DIAGNOSIS — Z23 NEED FOR TDAP VACCINATION: ICD-10-CM

## 2019-01-31 DIAGNOSIS — M25.551 BILATERAL HIP PAIN: ICD-10-CM

## 2019-01-31 DIAGNOSIS — L40.9 PSORIASIS: ICD-10-CM

## 2019-01-31 DIAGNOSIS — M79.89 LEFT LEG SWELLING: ICD-10-CM

## 2019-01-31 PROBLEM — R20.9 SENSATION DISTURBANCE OF SKIN: Status: RESOLVED | Noted: 2017-01-13 | Resolved: 2019-01-31

## 2019-01-31 PROCEDURE — 90471 IMMUNIZATION ADMIN: CPT | Performed by: NURSE PRACTITIONER

## 2019-01-31 PROCEDURE — 8041 PR SCP AHA: Performed by: NURSE PRACTITIONER

## 2019-01-31 PROCEDURE — 99213 OFFICE O/P EST LOW 20 MIN: CPT | Mod: 25 | Performed by: NURSE PRACTITIONER

## 2019-01-31 PROCEDURE — 90715 TDAP VACCINE 7 YRS/> IM: CPT | Performed by: NURSE PRACTITIONER

## 2019-01-31 RX ORDER — LEVOTHYROXINE SODIUM 175 UG/1
TABLET ORAL
Qty: 90 TAB | Refills: 0 | Status: SHIPPED | OUTPATIENT
Start: 2019-01-31 | End: 2019-05-02 | Stop reason: SDUPTHER

## 2019-01-31 ASSESSMENT — PAIN SCALES - GENERAL: PAINLEVEL: NO PAIN

## 2019-01-31 ASSESSMENT — PATIENT HEALTH QUESTIONNAIRE - PHQ9: CLINICAL INTERPRETATION OF PHQ2 SCORE: 0

## 2019-01-31 NOTE — ASSESSMENT & PLAN NOTE
Patient reports seasonal allergies were controlled with Claritin-D.  She reports she used to have a cough, now resolved.

## 2019-01-31 NOTE — ASSESSMENT & PLAN NOTE
"Chronic health problem, well controlled with lifestyle measures.  Due for updated lipid profile.  Reviewed lifestyle measures, including routine aerobic exercise, weight loss, low-fat diet.  Patient reports she does not exercise much.  She reports she will exercise a lot more when she is \"snowbirding\" for the next couple months.    Component      Latest Ref Rng & Units 2/21/2017 1/30/2018          11:16 AM 10:51 AM   Cholesterol,Tot      100 - 199 mg/dL 203 (H) 209 (H)   Triglycerides      0 - 149 mg/dL 81 94   HDL      >=40 mg/dL 55 59   LDL      <100 mg/dL 132 (H) 131 (H)     "

## 2019-01-31 NOTE — ASSESSMENT & PLAN NOTE
Patient reports this is a chronic intermittent problem, worsening in the last year.  Quality of pain is aching.  Aggravated by hip flexion.  Stretching helps.  Limiting patient from activity.  Will refer to physical therapy.

## 2019-01-31 NOTE — ASSESSMENT & PLAN NOTE
This is a chronic health problem.  Due for TSH. Taking levothyroxine 175 mcg a day. Denies skin or hair changes, brain fog, fatigue, constipation.  Will get updated TSH.  Patient is leaving for [place] in 2 days.  Will refill levothyroxine for 90 days.  Patient will have lab work done when she gets back.

## 2019-01-31 NOTE — ASSESSMENT & PLAN NOTE
Chronic health problem.  Not painful.  Always swollen.  Ultrasound of lower extremity last year negative for DVT.  Patient reports like has not changed since then.

## 2019-01-31 NOTE — ASSESSMENT & PLAN NOTE
Chronic problem for patient.  Weight is stable.  I encouraged physical activity and healthier food choices.  [place]

## 2019-01-31 NOTE — PROGRESS NOTES
"Subjective:     Tita Argueta is a 73 y.o. female here today for evaluation and management of the following health problems and Annual Health Assessment.    Hypothyroidism  This is a chronic health problem.  Due for TSH. Taking levothyroxine 175 mcg a day. Denies skin or hair changes, brain fog, fatigue, constipation.  Will get updated TSH.  Patient is leaving for [place] in 2 days.  Will refill levothyroxine for 90 days.  Patient will have lab work done when she gets back.      Dyslipidemia  Chronic health problem, well controlled with lifestyle measures.  Due for updated lipid profile.  Reviewed lifestyle measures, including routine aerobic exercise, weight loss, low-fat diet.  Patient reports she does not exercise much.  She reports she will exercise a lot more when she is \"snowbirding\" for the next couple months.    Component      Latest Ref Rng & Units 2/21/2017 1/30/2018          11:16 AM 10:51 AM   Cholesterol,Tot      100 - 199 mg/dL 203 (H) 209 (H)   Triglycerides      0 - 149 mg/dL 81 94   HDL      >=40 mg/dL 55 59   LDL      <100 mg/dL 132 (H) 131 (H)       Psoriasis  Chronic health problem.  On elbows.  Triamcinolone cream helps.  Not needing refill at this time.    Environmental and seasonal allergies  Patient reports seasonal allergies were controlled with Claritin-D.  She reports she used to have a cough, now resolved.      Left leg swelling  Chronic health problem.  Not painful.  Always swollen.  Ultrasound of lower extremity last year negative for DVT.  Patient reports like has not changed since then.    Morbid obesity with BMI of 40.0-44.9, adult (CMS-HCC)  Chronic problem for patient.  Weight is stable.  I encouraged physical activity and healthier food choices.  [place]      Bilateral hip pain  Patient reports this is a chronic intermittent problem, worsening in the last year.  Quality of pain is aching.  Aggravated by hip flexion.  " Stretching helps.  Limiting patient from activity.  Will refer to physical therapy.         Health Maintenance Summary                Annual Wellness Visit Overdue 1945     IMM DTaP/Tdap/Td Vaccine Overdue 7/25/1966     IMM ZOSTER VACCINES Overdue 7/25/1997     IMM PNEUMOCOCCAL 65+ (ADULT) LOW/MEDIUM RISK SERIES Overdue 7/25/2012     IMM INFLUENZA Overdue 9/1/2018      Done 11/23/2015 Imm Admin: Influenza Vaccine Adult HD    MAMMOGRAM Next Due 2/12/2019      Done 2/12/2018 MA-MAMMO SCREENING BILAT W/TOMOSYNTHESIS W/CAD     Patient has more history with this topic...    BONE DENSITY Next Due 2/12/2023      Done 2/12/2018 DS-BONE DENSITY STUDY (DEXA)     Patient has more history with this topic...    COLONOSCOPY Next Due 7/10/2023      Patient Declined 7/10/2013 declines at this time           Annual Health Assessment Questions:     1.  Are you currently engaging in any exercise or physical activity? No    2.  How would you describe your mood or emotional well-being today? fair    3.  Have you had any falls in the last year? No    4.  Have you noticed any problems with your balance or had difficulty walking? Yes    5.  In the last six months have you experienced any leakage of urine? Yes    6. DPA/Advanced Directive: Patient does not have an Advanced Directive.  A packet and workshop information was given on Advanced Directives.    Current medicines (including changes today)  Current Outpatient Prescriptions   Medication Sig Dispense Refill   • levothyroxine (SYNTHROID) 175 MCG Tab Take one tab daily on an empty stomach. 90 Tab 0   • triamcinolone acetonide (KENALOG) 0.1 % Cream APPLY TO THE AFFECTED AREA(S) TWICE DAILY. *MAX USE OF 14 DAYS. 1 Tube 1   • desonide (TRIDESILON) 0.05 % Cream Apply to affected area twice daily no more than 4 weeks at a time 30 g 1     No current facility-administered medications for this visit.        She  has a past medical history of History of thyroid cancer (7/10/2013);  "Hypothyroidism (12/1/2011); and Right hip pain (4/23/2014). She also has no past medical history of Breast cancer (HCC).    Pcn [penicillins]    She  reports that she has never smoked. She has never used smokeless tobacco. She reports that she drinks about 8.4 oz of alcohol per week . She reports that she uses drugs.  Counseling given: Not Answered      ROS   No chest pain, no shortness of breath, no abdominal pain, no blood in stool.     Objective:     Physical Exam:  Blood pressure 142/90, pulse 97, temperature 36.8 °C (98.2 °F), temperature source Temporal, resp. rate 20, height 1.676 m (5' 6\"), weight 117.9 kg (260 lb), SpO2 97 %, not currently breastfeeding. Body mass index is 41.97 kg/m².   Constitutional: Alert, no distress.  Skin: Warm, dry, good turgor, no rashes in visible areas.  Eye: Equal, round and reactive, conjunctiva clear, lids normal.  ENMT: Lips without lesions, good dentition, oropharynx clear.  Neck: Trachea midline, no masses, no thyromegaly. No cervical or supraclavicular lymphadenopathy.  Respiratory: Unlabored respiratory effort, lungs clear to auscultation, no wheezes, no rhonchi.  Cardiovascular: Normal S1, S2, no murmur, mild left LE edema, no erythema.  Abdomen: Soft, non-tender, no masses.  Psych: Alert and oriented, normal affect and mood.    Assessment and Plan:     There are no diagnoses linked to this encounter.    Discussion today about general wellness and lifestyle habits:    · Engage in regular physical activity and social activities.  · Prevent falls and reduce trip hazards; using ambulatory aides, hearing and vision testing if appropriate.  · Steps to improve urinary incontinence.  · Advanced care planning.    1. Hypothyroidism, unspecified type  Will refill levothyroxine.  Patient will get updated TSH upon return from [place] in 2 months.  - TSH; Future  - levothyroxine (SYNTHROID) 175 MCG Tab; Take one tab daily on an empty stomach.  Dispense: 90 Tab; Refill: 0    2. " Dyslipidemia  Work on lifestyle measures.  Will get updated lipid profile.  - Lipid Profile; Future  - COMP METABOLIC PANEL; Future  - ESTIMATED GFR; Future    3. Psoriasis  Well controlled with triamcinolone cream as needed.    4. Environmental and seasonal allergies  Continue with Claritin.    5. Left leg swelling  Continue to monitor.    6. Morbid obesity with BMI of 40.0-44.9, adult (HCC)    - Patient identified as having weight management issue.  Appropriate orders and counseling given.    7. Bilateral hip pain  Patient would like to start physical therapy when she returns from [place] in 2 months.  - REFERRAL TO PHYSICAL THERAPY Reason for Therapy: Eval/Treat/Report    8. Need for Tdap vaccination  Given peer  - Tdap =>6yo IM    Follow-Up: Patient will call for appointment when she returns from [place] in 2 months.         PLEASE NOTE: This dictation was created using voice recognition software. I have made every reasonable attempt to correct obvious errors, but I expect that there are errors of grammar and possibly content that I did not discover before finalizing the note.

## 2019-04-25 ENCOUNTER — HOSPITAL ENCOUNTER (OUTPATIENT)
Dept: LAB | Facility: MEDICAL CENTER | Age: 74
End: 2019-04-25
Attending: NURSE PRACTITIONER
Payer: MEDICARE

## 2019-04-25 DIAGNOSIS — E78.5 DYSLIPIDEMIA: ICD-10-CM

## 2019-04-25 DIAGNOSIS — E03.9 HYPOTHYROIDISM, UNSPECIFIED TYPE: ICD-10-CM

## 2019-04-25 LAB
ALBUMIN SERPL BCP-MCNC: 4 G/DL (ref 3.2–4.9)
ALBUMIN/GLOB SERPL: 1.2 G/DL
ALP SERPL-CCNC: 97 U/L (ref 30–99)
ALT SERPL-CCNC: 14 U/L (ref 2–50)
ANION GAP SERPL CALC-SCNC: 6 MMOL/L (ref 0–11.9)
AST SERPL-CCNC: 15 U/L (ref 12–45)
BILIRUB SERPL-MCNC: 0.8 MG/DL (ref 0.1–1.5)
BUN SERPL-MCNC: 12 MG/DL (ref 8–22)
CALCIUM SERPL-MCNC: 9.4 MG/DL (ref 8.5–10.5)
CHLORIDE SERPL-SCNC: 105 MMOL/L (ref 96–112)
CHOLEST SERPL-MCNC: 216 MG/DL (ref 100–199)
CO2 SERPL-SCNC: 27 MMOL/L (ref 20–33)
CREAT SERPL-MCNC: 0.93 MG/DL (ref 0.5–1.4)
FASTING STATUS PATIENT QL REPORTED: NORMAL
GLOBULIN SER CALC-MCNC: 3.3 G/DL (ref 1.9–3.5)
GLUCOSE SERPL-MCNC: 83 MG/DL (ref 65–99)
HDLC SERPL-MCNC: 60 MG/DL
LDLC SERPL CALC-MCNC: 139 MG/DL
POTASSIUM SERPL-SCNC: 4.1 MMOL/L (ref 3.6–5.5)
PROT SERPL-MCNC: 7.3 G/DL (ref 6–8.2)
SODIUM SERPL-SCNC: 138 MMOL/L (ref 135–145)
TRIGL SERPL-MCNC: 86 MG/DL (ref 0–149)
TSH SERPL DL<=0.005 MIU/L-ACNC: 3.55 UIU/ML (ref 0.38–5.33)

## 2019-04-25 PROCEDURE — 36415 COLL VENOUS BLD VENIPUNCTURE: CPT

## 2019-04-25 PROCEDURE — 84443 ASSAY THYROID STIM HORMONE: CPT

## 2019-04-25 PROCEDURE — 80061 LIPID PANEL: CPT

## 2019-04-25 PROCEDURE — 80053 COMPREHEN METABOLIC PANEL: CPT

## 2019-04-29 ENCOUNTER — PATIENT MESSAGE (OUTPATIENT)
Dept: MEDICAL GROUP | Facility: PHYSICIAN GROUP | Age: 74
End: 2019-04-29

## 2019-04-29 DIAGNOSIS — E03.9 HYPOTHYROIDISM, UNSPECIFIED TYPE: ICD-10-CM

## 2019-05-02 RX ORDER — LEVOTHYROXINE SODIUM 175 UG/1
TABLET ORAL
Qty: 90 TAB | Refills: 3 | Status: SHIPPED | OUTPATIENT
Start: 2019-05-02

## 2019-05-17 ENCOUNTER — HOSPITAL ENCOUNTER (OUTPATIENT)
Dept: RADIOLOGY | Facility: MEDICAL CENTER | Age: 74
End: 2019-05-17
Attending: NURSE PRACTITIONER
Payer: MEDICARE

## 2019-05-17 DIAGNOSIS — Z12.39 BREAST CANCER SCREENING: ICD-10-CM

## 2019-05-17 PROCEDURE — 77063 BREAST TOMOSYNTHESIS BI: CPT
